# Patient Record
Sex: MALE | Race: WHITE
[De-identification: names, ages, dates, MRNs, and addresses within clinical notes are randomized per-mention and may not be internally consistent; named-entity substitution may affect disease eponyms.]

---

## 2020-09-04 ENCOUNTER — HOSPITAL ENCOUNTER (OUTPATIENT)
Dept: HOSPITAL 61 - LAB | Age: 60
Discharge: HOME | End: 2020-09-04
Attending: SURGERY
Payer: OTHER GOVERNMENT

## 2020-09-04 DIAGNOSIS — Z20.828: Primary | ICD-10-CM

## 2020-09-10 ENCOUNTER — HOSPITAL ENCOUNTER (OUTPATIENT)
Dept: HOSPITAL 61 - SURG | Age: 60
Discharge: HOME | End: 2020-09-10
Attending: SURGERY
Payer: OTHER GOVERNMENT

## 2020-09-10 VITALS — WEIGHT: 237 LBS | BODY MASS INDEX: 32.1 KG/M2 | HEIGHT: 72 IN

## 2020-09-10 VITALS — SYSTOLIC BLOOD PRESSURE: 138 MMHG | DIASTOLIC BLOOD PRESSURE: 81 MMHG

## 2020-09-10 DIAGNOSIS — K42.0: Primary | ICD-10-CM

## 2020-09-10 DIAGNOSIS — Z79.899: ICD-10-CM

## 2020-09-10 PROCEDURE — A7015 AEROSOL MASK USED W NEBULIZE: HCPCS

## 2020-09-10 PROCEDURE — C1781 MESH (IMPLANTABLE): HCPCS

## 2020-09-10 PROCEDURE — 82962 GLUCOSE BLOOD TEST: CPT

## 2020-09-10 PROCEDURE — 49653: CPT

## 2020-09-10 RX ADMIN — SODIUM CHLORIDE, SODIUM LACTATE, POTASSIUM CHLORIDE, AND CALCIUM CHLORIDE SCH MLS/HR: .6; .31; .03; .02 INJECTION, SOLUTION INTRAVENOUS at 10:39

## 2020-09-10 RX ADMIN — SODIUM CHLORIDE, SODIUM LACTATE, POTASSIUM CHLORIDE, AND CALCIUM CHLORIDE SCH MLS/HR: .6; .31; .03; .02 INJECTION, SOLUTION INTRAVENOUS at 07:33

## 2020-09-10 RX ADMIN — INSULIN LISPRO PRN UNIT: 100 INJECTION, SOLUTION INTRAVENOUS; SUBCUTANEOUS at 10:11

## 2020-09-10 RX ADMIN — INSULIN LISPRO PRN UNIT: 100 INJECTION, SOLUTION INTRAVENOUS; SUBCUTANEOUS at 07:43

## 2020-09-10 NOTE — PDOC4
Operative Note


Operative Note


Date: 9/10/2020 at 952   


Preoperative diagnosis: Incarcerated umbilical hernia


Postoperative diagnosis: Same


Procedure: Robotic assisted laparoscopic umbilical hernia repair with mesh


Surgeon: Chandra


Specimen: None


Dictation: Patient is 60-year-old gentleman is complained of a painful bulge at 

his umbilicus.  The procedure of robotic assisted laparoscopic umbilical hernia 

repair with mesh was explained to the patient detail risk benefits were also 

discussed including bleeding infection injury to intra-abdominal contents 

possible necessitating further open operations.  Patient seemed understand and 

gave both verbal and written consent to have the procedure performed.  Patient 

was taken to the operating room placed in the supine position general anesthesia

was initiated once patient was sleeping intubated his abdomen was prepped and 

draped usual sterile fashion using ChloraPrep.  Area in the left upper quadrant 

was injected quarter percent Marcaine with epinephrine incision was made with a 

blade scalpel and a varies needle was placed within the abdomen creating 

pneumoperitoneum once this was complete a millimeter da Alyssa port was placed in

the da Alyssa camera was placed within the abdomen which was inspected it was 

noted that the umbilical hernia was visualized and there was incarcerated 

omentum.  A 8 mm da Alyssa port was placed in the left midabdomen and an 8 mm da 

Alyssa ports placed in the left lower abdomen.  The da Alyssa robot was brought 

and docked all port sites surgeon went to the robotic console using a grasper 

and Endo Saima scissors the hernia contents were reduced and the hernia sac 

reduced.  The fascial defect was then closed with a running 2 OV lock 

nonabsorbable suture a ventral light ST mesh was then placed over the hernia 

defect this was sewn into place with the 2 OV lock nonabsorbable suture and then

the peritoneum was closed over the mesh with a 2 OV lock absorbable suture.  

Once this complete the robot was undocked all port sites the pneumoperitoneum 

was reduced all ports were removed the port sites were all closed with 4 

subcuticular Monocryl Mastisol Steri-Strips and island dressings were applied.  

Patient was awakened and extubated in operating room taken to recovery in stable

condition all sponge instrument needle counts listed as correct estimated blood 

loss 20 mL.











CHRISTINE HAYES MD             Sep 10, 2020 09:55

## 2020-09-10 NOTE — DISCH
DISCHARGE INSTRUCTIONS


Condition on Discharge


Condition on Discharge:  Stable





Activity After Discharge


Activity Instructions for Disc:  Avoid exertion


Other activity instructions:  No lifting more than 20 pounds for 2 weeks





Diet after Discharge


Diet after Discharge:  Regular





Wound Incision Care


Other wound/incision instructi:  May shower in 24 hours





Contacting the  after DC


Call your doctor for:  If your condition worsens





Follow-Up


Follow up with:  Follow-up Dr. Hayes in 2 weeks











CHRISTINE HAYES MD             Sep 10, 2020 09:56